# Patient Record
Sex: FEMALE | Race: ASIAN | NOT HISPANIC OR LATINO | URBAN - METROPOLITAN AREA
[De-identification: names, ages, dates, MRNs, and addresses within clinical notes are randomized per-mention and may not be internally consistent; named-entity substitution may affect disease eponyms.]

---

## 2022-12-28 ENCOUNTER — OFFICE VISIT (OUTPATIENT)
Dept: FAMILY MEDICINE | Facility: CLINIC | Age: 52
End: 2022-12-28
Payer: COMMERCIAL

## 2022-12-28 VITALS
WEIGHT: 136.6 LBS | RESPIRATION RATE: 16 BRPM | DIASTOLIC BLOOD PRESSURE: 97 MMHG | HEART RATE: 70 BPM | OXYGEN SATURATION: 98 % | TEMPERATURE: 98.5 F | SYSTOLIC BLOOD PRESSURE: 168 MMHG

## 2022-12-28 DIAGNOSIS — R30.0 DYSURIA: Primary | ICD-10-CM

## 2022-12-28 DIAGNOSIS — N89.8 VAGINAL DISCHARGE: ICD-10-CM

## 2022-12-28 LAB
ALBUMIN UR-MCNC: NEGATIVE MG/DL
APPEARANCE UR: CLEAR
BACTERIA #/AREA URNS HPF: ABNORMAL /HPF
BILIRUB UR QL STRIP: NEGATIVE
COLOR UR AUTO: YELLOW
GLUCOSE UR STRIP-MCNC: NEGATIVE MG/DL
HGB UR QL STRIP: ABNORMAL
KETONES UR STRIP-MCNC: NEGATIVE MG/DL
LEUKOCYTE ESTERASE UR QL STRIP: ABNORMAL
NITRATE UR QL: NEGATIVE
PH UR STRIP: 6 [PH] (ref 5–8)
RBC #/AREA URNS AUTO: ABNORMAL /HPF
SP GR UR STRIP: 1.01 (ref 1–1.03)
SQUAMOUS #/AREA URNS AUTO: ABNORMAL /LPF
UROBILINOGEN UR STRIP-ACNC: 0.2 E.U./DL
WBC #/AREA URNS AUTO: ABNORMAL /HPF

## 2022-12-28 PROCEDURE — 99203 OFFICE O/P NEW LOW 30 MIN: CPT | Performed by: PHYSICIAN ASSISTANT

## 2022-12-28 PROCEDURE — 81001 URINALYSIS AUTO W/SCOPE: CPT | Performed by: PHYSICIAN ASSISTANT

## 2022-12-28 PROCEDURE — 87088 URINE BACTERIA CULTURE: CPT | Performed by: PHYSICIAN ASSISTANT

## 2022-12-28 PROCEDURE — 87086 URINE CULTURE/COLONY COUNT: CPT | Performed by: PHYSICIAN ASSISTANT

## 2022-12-28 RX ORDER — CEFDINIR 300 MG/1
300 CAPSULE ORAL 2 TIMES DAILY
Qty: 20 CAPSULE | Refills: 0 | Status: SHIPPED | OUTPATIENT
Start: 2022-12-28 | End: 2023-01-07

## 2022-12-28 NOTE — PATIENT INSTRUCTIONS
Increased fluids and rest.  Discussed signs and symptoms of ascending urinary tract infection symptoms to include pyelonephritis. Instructed to turn to clinic if there are increased fever chills night sweats fatigue abdominal pain or flank pain  Antibiotic as written. Risks and benefits of medication discussed.  Indication for return to clinic.      Urinary Tract Infections in Women    Urinary tract infections (UTIs) are most often caused by bacteria (germs). These bacteria enter the urinary tract. The bacteria may come from outside the body. Or they may travel from the skin outside the rectum or vagina into the urethra. Female anatomy makes it easier for bacteria from the bowel to enter a woman s urinary tract, which is the most common source of UTI. This means women develop UTIs more often than men. Pain in or around the urinary tract is a common UTI symptom. But the only way to know for sure if you have a UTI for the health care provider to test your urine. The two tests that may be done are the urinalysis and urine culture.  Types of UTIs  Cystitis: A bladder infection (cystitis) is the most common UTI in women. You may have urgent or frequent urination. You may also have pain, burning when you urinate, and bloody urine.  Urethritis: This is an inflamed urethra, which is the tube that carries urine from the bladder to outside the body. You may have lower stomach or back pain. You may also have urgent or frequent urination.  Pyelonephritis: This is a kidney infection. If not treated, it can be serious and damage your kidneys. In severe cases, you may be hospitalized. You may have a fever and lower back pain.  Medications to treat a UTI  Most UTIs are treated with antibiotics. These kill the bacteria. The length of time you need to take them depends on the type of infection. It may be as short as 3 days. If you have repeated UTIs, a low-dose antibiotic may be needed for several months. Take antibiotics exactly as  directed. Don t stop taking them until all of the medication is gone. If you stop taking the antibiotic too soon, the infection may not go away, and you may develop a resistance to the antibiotic. This can make it much harder to treat.  Lifestyle changes to treat and prevent UTIs  The lifestyle changes below will help get rid of your UTI. They may also help prevent future UTIs.  Drink plenty of fluids. This includes water, juice, or other caffeine-free drinks. Fluids help flush bacteria out of your body.  Empty your bladder. Always empty your bladder when you feel the urge to urinate. And always urinate before going to sleep. Urine that stays in your bladder can lead to infection. Try to urinate before and after sex as well.  Practice good personal hygiene. Wipe yourself from front to back after using the toilet. This helps keep bacteria from getting into the urethra.  Use condoms during sex. These help prevent UTIs caused by sexually transmitted bacteria. Also, avoid using spermicides during sex. These can increase the risk of UTIs. Choose other forms of birth control instead. For women who tend to get UTIs after sex, a low-dose of a preventive antibiotic may be used. Be sure to discuss this option with your health care provider.  Follow up with your health care provider as directed. He or she may test to make sure the infection has cleared. If necessary, additional treatment may be started.  Date Last Reviewed: 9/8/2014 2000-2016 [employer]. [address] [place] [address], [place] [address]. All rights reserved. This information is not intended as a substitute for professional medical care. Always follow your healthcare professional's instructions.

## 2022-12-28 NOTE — PROGRESS NOTES
t   Patient presents with:  Urinary Problem: X first week December. Burning. Frequency. Urgency. Vaginal itchy x yesterday. Bright yellow to light red. Stronger odor. Aching before urinating.      Clinical Decision Making:  Multiple etiologies and diagnoses were considered to include but not limited to urinary tract infection, dysuria, hyperglycemia.  Patient is treated for a long course of treatment based on her age and other inclusion criteria.  Patient is treated with antibiotic and creatinine clearance and allergies were reviewed and expected course of resolution and indication for return was gone over.    30 min spent on the date of the encounter in chart review, patient visit, review of tests, documentation and/ordiscussion with other providers about the issues documented above. Extra time was taken to go over history physical and treatment plan with .       ICD-10-CM    1. Dysuria  R30.0 UA macro with reflex to Microscopic and Culture - Clinc Collect     cefdinir (OMNICEF) 300 MG capsule     Urine Microscopic Exam     Urine Culture      2. Vaginal discharge  N89.8 CANCELED: Wet prep - Clinic Collect          Patient Instructions   Increased fluids and rest.  Discussed signs and symptoms of ascending urinary tract infection symptoms to include pyelonephritis. Instructed to turn to clinic if there are increased fever chills night sweats fatigue abdominal pain or flank pain  Antibiotic as written. Risks and benefits of medication discussed.  Indication for return to clinic.      Urinary Tract Infections in Women    Urinary tract infections (UTIs) are most often caused by bacteria (germs). These bacteria enter the urinary tract. The bacteria may come from outside the body. Or they may travel from the skin outside the rectum or vagina into the urethra. Female anatomy makes it easier for bacteria from the bowel to enter a woman s urinary tract, which is the most common source of UTI. This means women  develop UTIs more often than men. Pain in or around the urinary tract is a common UTI symptom. But the only way to know for sure if you have a UTI for the health care provider to test your urine. The two tests that may be done are the urinalysis and urine culture.  Types of UTIs    Cystitis: A bladder infection (cystitis) is the most common UTI in women. You may have urgent or frequent urination. You may also have pain, burning when you urinate, and bloody urine.    Urethritis: This is an inflamed urethra, which is the tube that carries urine from the bladder to outside the body. You may have lower stomach or back pain. You may also have urgent or frequent urination.    Pyelonephritis: This is a kidney infection. If not treated, it can be serious and damage your kidneys. In severe cases, you may be hospitalized. You may have a fever and lower back pain.  Medications to treat a UTI  Most UTIs are treated with antibiotics. These kill the bacteria. The length of time you need to take them depends on the type of infection. It may be as short as 3 days. If you have repeated UTIs, a low-dose antibiotic may be needed for several months. Take antibiotics exactly as directed. Don t stop taking them until all of the medication is gone. If you stop taking the antibiotic too soon, the infection may not go away, and you may develop a resistance to the antibiotic. This can make it much harder to treat.  Lifestyle changes to treat and prevent UTIs  The lifestyle changes below will help get rid of your UTI. They may also help prevent future UTIs.    Drink plenty of fluids. This includes water, juice, or other caffeine-free drinks. Fluids help flush bacteria out of your body.    Empty your bladder. Always empty your bladder when you feel the urge to urinate. And always urinate before going to sleep. Urine that stays in your bladder can lead to infection. Try to urinate before and after sex as well.    Practice good personal hygiene.  Wipe yourself from front to back after using the toilet. This helps keep bacteria from getting into the urethra.    Use condoms during sex. These help prevent UTIs caused by sexually transmitted bacteria. Also, avoid using spermicides during sex. These can increase the risk of UTIs. Choose other forms of birth control instead. For women who tend to get UTIs after sex, a low-dose of a preventive antibiotic may be used. Be sure to discuss this option with your health care provider.    Follow up with your health care provider as directed. He or she may test to make sure the infection has cleared. If necessary, additional treatment may be started.  Date Last Reviewed: 9/8/2014 2000-2016 [employer]. [address] [place] [address], [place] [address]. All rights reserved. This information is not intended as a substitute for professional medical care. Always follow your healthcare professional's instructions.                        HPI:  Amina Schumacher is a 52 year old female who presents today for a one week history of urinary hesitancy urgency frequency and dysuria and slight gross hematuria.  Patient had vaginal itching yesterday but did not have vaginal discharge.  No abdominal or back pain fever chills or night sweats to report.  Declines concern for STD screening    History obtained from chart review and the patient.    Problem List:  There are no relevant problems documented for this patient.      History reviewed. No pertinent past medical history.    Social History     Tobacco Use     Smoking status: Never     Smokeless tobacco: Never   Substance Use Topics     Alcohol use: Not on file       Review of Systems  As above in HPI otherwise negative.    Vitals:    12/28/22 1143   BP: (!) 168/97   BP Location: Right arm   Patient Position: Sitting   Cuff Size: Adult Regular   Pulse: 70   Resp: 16   Temp: 98.5  F (36.9  C)   TempSrc: Oral   SpO2: 98%   Weight: 62 kg (136 lb 9.6 oz)       General: Patient is resting  comfortably no acute distress is afebrile  HEENT: Head is normocephalic atraumatic   eyes are PERRL EOMI sclera anicteric   TMs are clear bilaterally  Throat is with mild pharyngeal wall erythema and no exudate  No cervical lymphadenopathy present  LUNGS: Clear to auscultation bilaterally  HEART: Regular rate and rhythm  Abdomen: Suprapubic tenderness to palpation but no induration no CVA tenderness to percussion  Skin: Without rash non-diaphoretic    Physical Exam      Labs:  Results for orders placed or performed in visit on 12/28/22   UA macro with reflex to Microscopic and Culture - [facility] Collect     Status: Abnormal    Specimen: Urine, Clean Catch   Result Value Ref Range    Color Urine Yellow Colorless, Straw, Light Yellow, Yellow    Appearance Urine Clear Clear    Glucose Urine Negative Negative mg/dL    Bilirubin Urine Negative Negative    Ketones Urine Negative Negative mg/dL    Specific Gravity Urine 1.010 1.005 - 1.030    Blood Urine Trace (A) Negative    pH Urine 6.0 5.0 - 8.0    Protein Albumin Urine Negative Negative mg/dL    Urobilinogen Urine 0.2 0.2, 1.0 E.U./dL    Nitrite Urine Negative Negative    Leukocyte Esterase Urine Moderate (A) Negative   Urine Microscopic Exam     Status: Abnormal   Result Value Ref Range    Bacteria Urine Few (A) None Seen /HPF    RBC Urine 0-2 0-2 /HPF /HPF    WBC Urine 5-10 (A) 0-5 /HPF /HPF    Squamous Epithelials Urine Few (A) None Seen /LPF                           Urine culture is pending.    At the end of the encounter, I discussed results, diagnosis, medications. Discussed red flags for immediate return to clinic/ER, as well as indications for follow up if no improvement. Patient understood and agreed to plan. Patient was stable for discharge.

## 2022-12-30 LAB
BACTERIA UR CULT: ABNORMAL
BACTERIA UR CULT: ABNORMAL

## 2023-03-10 ENCOUNTER — OFFICE VISIT (OUTPATIENT)
Dept: FAMILY MEDICINE | Facility: CLINIC | Age: 53
End: 2023-03-10
Payer: COMMERCIAL

## 2023-03-10 VITALS
TEMPERATURE: 98.4 F | HEART RATE: 74 BPM | OXYGEN SATURATION: 97 % | SYSTOLIC BLOOD PRESSURE: 154 MMHG | DIASTOLIC BLOOD PRESSURE: 91 MMHG | RESPIRATION RATE: 16 BRPM | WEIGHT: 136 LBS

## 2023-03-10 DIAGNOSIS — Z11.4 SCREENING FOR HIV (HUMAN IMMUNODEFICIENCY VIRUS): ICD-10-CM

## 2023-03-10 DIAGNOSIS — R30.0 DYSURIA: ICD-10-CM

## 2023-03-10 DIAGNOSIS — I10 ESSENTIAL HYPERTENSION: Primary | ICD-10-CM

## 2023-03-10 DIAGNOSIS — Z11.59 NEED FOR HEPATITIS C SCREENING TEST: ICD-10-CM

## 2023-03-10 DIAGNOSIS — Z13.220 SCREENING FOR HYPERLIPIDEMIA: ICD-10-CM

## 2023-03-10 LAB
ALBUMIN UR-MCNC: NEGATIVE MG/DL
ANION GAP SERPL CALCULATED.3IONS-SCNC: 10 MMOL/L (ref 3–14)
APPEARANCE UR: CLEAR
BILIRUB UR QL STRIP: NEGATIVE
BUN SERPL-MCNC: 9 MG/DL (ref 7–30)
CALCIUM SERPL-MCNC: 9.8 MG/DL (ref 8.5–10.1)
CHLORIDE BLD-SCNC: 99 MMOL/L (ref 94–109)
CHOLEST SERPL-MCNC: 347 MG/DL
CO2 SERPL-SCNC: 34 MMOL/L (ref 20–32)
COLOR UR AUTO: YELLOW
CREAT SERPL-MCNC: 0.7 MG/DL (ref 0.52–1.04)
CREAT UR-MCNC: 16 MG/DL
GFR SERPL CREATININE-BSD FRML MDRD: >90 ML/MIN/1.73M2
GLUCOSE BLD-MCNC: 94 MG/DL (ref 70–99)
GLUCOSE UR STRIP-MCNC: NEGATIVE MG/DL
HDLC SERPL-MCNC: 64 MG/DL
HGB UR QL STRIP: NEGATIVE
KETONES UR STRIP-MCNC: NEGATIVE MG/DL
LDLC SERPL CALC-MCNC: 262 MG/DL
LEUKOCYTE ESTERASE UR QL STRIP: NEGATIVE
MICROALBUMIN UR-MCNC: <12 MG/L
MICROALBUMIN/CREAT UR: NORMAL MG/G{CREAT}
NITRATE UR QL: NEGATIVE
NONHDLC SERPL-MCNC: 283 MG/DL
PH UR STRIP: 7 [PH] (ref 5–8)
POTASSIUM BLD-SCNC: 4.5 MMOL/L (ref 3.4–5.3)
SODIUM SERPL-SCNC: 143 MMOL/L (ref 133–144)
SP GR UR STRIP: 1.01 (ref 1–1.03)
TRIGL SERPL-MCNC: 105 MG/DL
TSH SERPL DL<=0.005 MIU/L-ACNC: 1.9 UIU/ML (ref 0.3–4.2)
UROBILINOGEN UR STRIP-ACNC: 0.2 E.U./DL

## 2023-03-10 PROCEDURE — 80061 LIPID PANEL: CPT | Performed by: STUDENT IN AN ORGANIZED HEALTH CARE EDUCATION/TRAINING PROGRAM

## 2023-03-10 PROCEDURE — 82043 UR ALBUMIN QUANTITATIVE: CPT | Performed by: STUDENT IN AN ORGANIZED HEALTH CARE EDUCATION/TRAINING PROGRAM

## 2023-03-10 PROCEDURE — 82570 ASSAY OF URINE CREATININE: CPT | Performed by: STUDENT IN AN ORGANIZED HEALTH CARE EDUCATION/TRAINING PROGRAM

## 2023-03-10 PROCEDURE — 87389 HIV-1 AG W/HIV-1&-2 AB AG IA: CPT | Performed by: STUDENT IN AN ORGANIZED HEALTH CARE EDUCATION/TRAINING PROGRAM

## 2023-03-10 PROCEDURE — 36415 COLL VENOUS BLD VENIPUNCTURE: CPT | Performed by: STUDENT IN AN ORGANIZED HEALTH CARE EDUCATION/TRAINING PROGRAM

## 2023-03-10 PROCEDURE — 80048 BASIC METABOLIC PNL TOTAL CA: CPT | Performed by: STUDENT IN AN ORGANIZED HEALTH CARE EDUCATION/TRAINING PROGRAM

## 2023-03-10 PROCEDURE — 86803 HEPATITIS C AB TEST: CPT | Performed by: STUDENT IN AN ORGANIZED HEALTH CARE EDUCATION/TRAINING PROGRAM

## 2023-03-10 PROCEDURE — 84443 ASSAY THYROID STIM HORMONE: CPT | Performed by: STUDENT IN AN ORGANIZED HEALTH CARE EDUCATION/TRAINING PROGRAM

## 2023-03-10 PROCEDURE — 81003 URINALYSIS AUTO W/O SCOPE: CPT | Performed by: STUDENT IN AN ORGANIZED HEALTH CARE EDUCATION/TRAINING PROGRAM

## 2023-03-10 PROCEDURE — 99214 OFFICE O/P EST MOD 30 MIN: CPT | Mod: GC | Performed by: STUDENT IN AN ORGANIZED HEALTH CARE EDUCATION/TRAINING PROGRAM

## 2023-03-10 RX ORDER — LOSARTAN POTASSIUM 50 MG/1
50 TABLET ORAL DAILY
Qty: 30 TABLET | Refills: 1 | Status: SHIPPED | OUTPATIENT
Start: 2023-03-10 | End: 2023-03-24

## 2023-03-10 NOTE — NURSING NOTE
Due to patient being non-English speaking/uses sign language, an  was used for this visit. Only for face-to-face interpretation by an external agency, date and length of interpretation can be found on the scanned worksheet.     name: Lawrence  Agency: [employer]  Language: Cindi   Telephone number: [phone]  Type of interpretation: Telephone, spoken

## 2023-03-10 NOTE — LETTER
March 15, 2023      Amina Schumacher  [address] [place] [address]        Dear ,    We are writing to inform you of your test results.    Dear Ma,     These are the results of your recent blood test.  They show high cholesterol.  We will discuss medication at our next visit on March 24.  Your kidney and thyroid function are normal.     This is just for your records.  We will go over these results next week.     Sincerely,   Dr. Yost       Resulted Orders   HIV Screening   Result Value Ref Range    HIV Antigen Antibody Combo Nonreactive Nonreactive      Comment:      HIV-1 p24 Ag & HIV-1/HIV-2 Ab Not Detected   Hepatitis C Screen Reflex to HCV RNA Quant and Genotype   Result Value Ref Range    Hepatitis C Antibody Nonreactive Nonreactive    Narrative    Assay performance characteristics have not been established for newborns, infants, and children.   Lipid panel reflex to direct LDL Non-fasting   Result Value Ref Range    Cholesterol 347 (H) <200 mg/dL    Triglycerides 105 <150 mg/dL    Direct Measure HDL 64 >=50 mg/dL    LDL Cholesterol Calculated 262 (H) <=100 mg/dL    Non HDL Cholesterol 283 (H) <130 mg/dL    Narrative    Cholesterol  Desirable:  <200 mg/dL    Triglycerides  Normal:  Less than 150 mg/dL  Borderline High:  150-199 mg/dL  High:  200-499 mg/dL  Very High:  Greater than or equal to 500 mg/dL    Direct Measure HDL  Female:  Greater than or equal to 50 mg/dL   Male:  Greater than or equal to 40 mg/dL    LDL Cholesterol  Desirable:  <100mg/dL  Above Desirable:  100-129 mg/dL   Borderline High:  130-159 mg/dL   High:  160-189 mg/dL   Very High:  >= 190 mg/dL    Non HDL Cholesterol  Desirable:  130 mg/dL  Above Desirable:  130-159 mg/dL  Borderline High:  160-189 mg/dL  High:  190-219 mg/dL  Very High:  Greater than or equal to 220 mg/dL   Basic metabolic panel   Result Value Ref Range    Sodium 143 133 - 144 mmol/L    Potassium 4.5 3.4 - 5.3 mmol/L    Chloride 99 94 - 109 mmol/L    Carbon  Dioxide (CO2) 34 (H) 20 - 32 mmol/L    Anion Gap 10 3 - 14 mmol/L    Urea Nitrogen 9 7 - 30 mg/dL    Creatinine 0.70 0.52 - 1.04 mg/dL    Calcium 9.8 8.5 - 10.1 mg/dL    Glucose 94 70 - 99 mg/dL    GFR Estimate >90 >60 mL/min/1.73m2      Comment:      eGFR calculated using 2021 CKD-EPI equation.   Albumin Random Urine Quantitative with Creat Ratio   Result Value Ref Range    Creatinine Urine mg/dL 16.0 mg/dL      Comment:      The reference ranges have not been established in urine creatinine. The results should be integrated into the clinical context for interpretation.    Albumin Urine mg/L <12.0 mg/L      Comment:      The reference ranges have not been established in urine albumin. The results should be integrated into the clinical context for interpretation.    Albumin Urine mg/g Cr        Comment:      Unable to calculate, urine albumin and/or urine creatinine is outside detectable limits.  Microalbuminuria is defined as an albumin:creatinine ratio of 17 to 299 for males and 25 to 299 for females. A ratio of albumin:creatinine of 300 or higher is indicative of overt proteinuria.  Due to biologic variability, positive results should be confirmed by a second, first-morning random or 24-hour timed urine specimen. If there is discrepancy, a third specimen is recommended. When 2 out of 3 results are in the microalbuminuria range, this is evidence for incipient nephropathy and warrants increased efforts at glucose control, blood pressure control, and institution of therapy with an angiotensin-converting-enzyme (ACE) inhibitor (if the patient can tolerate it).     UA reflex to Microscopic   Result Value Ref Range    Color Urine Yellow Colorless, Straw, Light Yellow, Yellow    Appearance Urine Clear Clear    Glucose Urine Negative Negative mg/dL    Bilirubin Urine Negative Negative    Ketones Urine Negative Negative mg/dL    Specific Gravity Urine 1.010 1.005 - 1.030    Blood Urine Negative Negative    pH Urine 7.0  5.0 - 8.0    Protein Albumin Urine Negative Negative mg/dL    Urobilinogen Urine 0.2 0.2, 1.0 E.U./dL    Nitrite Urine Negative Negative    Leukocyte Esterase Urine Negative Negative    Narrative    Microscopic not indicated   TSH with free T4 reflex   Result Value Ref Range    TSH 1.90 0.30 - 4.20 uIU/mL       If you have any questions or concerns, please call the clinic at the number listed above.       Sincerely,      Russel Magaña MD

## 2023-03-10 NOTE — PROGRESS NOTES
Assessment & Plan   Encounter Date: Mar 10, 2023    Essential hypertension  Patient reports blood pressure levels around 160/120 at home, taken in the evening. She has no history of hypertension and has never taken medications for this. Has headaches associated with her hypertensive episodes. Thyroid exam was normal. Reviewed BMP.  Cr wnl.  K 4.5.  - Basic metabolic panel; Future  - Albumin Random Urine Quantitative with Creat Ratio; Future  - losartan (COZAAR) 50 MG tablet; Take 1 tablet (50 mg) by mouth daily for 30 days  - TSH with free T4 reflex; Future  - Lifestyle changes; low salt diet, increase exercise each day (walk 20 minutes)    Dysuria  Previous UTI treated on 12/28/22 for gardnerella vaginalis. She continues to have some dysuria, but says the symptoms are less severe and she denies pelvic pain. She also denies vaginal itchiness or discharge. She agrees with urinalysis to monitor treatment of infection.   - UA reflex to Microscopic; Future    Screening for HIV (human immunodeficiency virus)  Need for hepatitis C screening test  Screening for hyperlipidemia  Patient due for general screening. She is not concerned about STI.  - HIV Screening; Future  - Hepatitis C Screen Reflex to HCV RNA Quant and Genotype; Future  - Lipid panel reflex to direct LDL Non-fasting; Future      Follow-Up in two weeks    Sampson Fung, MS3  [employer]    Resident/Fellow Attestation   I, Mirna Yost MD, was present with the medical/GIANNA student who participated in the service and in the documentation of the note.  I have verified the history and personally performed the physical exam and medical decision making.  I agree with the assessment and plan of care as documented in the note.      Mirna Yost MD  PGY3      Mirna Yost MD PGY-3   [facility]   Staffed with Dr. Magaña.        ______________________________________________________    Subjective       CC:  Other (Pt originally wanted to do a physical /High BP check /Dm check and cholesterol check )    HPI:  Ms. Amina Schumacher is a(n) 53 year old female who presents today as a new patient for:  Hypertension   Headaches  Dysuria    Patient presents to the clinic with concern for elevated blood pressure and desire for diabetes and hyperlipidemia screening. She has been taking her blood pressures at home and frequently gets pressures around 160/100. She also reports associated constant right sided headaches when she has elevated blood pressure. The patient denies any nausea or vomiting but does report some neck pain with the headaches. She denies any chest discomfort or difficulty breathing with these episodes. The patient has no history of diagnosed hypertension and has never taken any medications for this. She denies family history of hypertension, heart disease, or diabetes. Patient is concerned about diabetes and reports that she would like to be screened today.     The patient also has some urinary symptoms. She was treated for a urinary tract infection on 12/28/22 and was experiencing burning and urgency with urination with a strong odor and a yellow or light red color. She said symptoms have improved but it is still uncomfortable when she urinates. She has no pelvic pain, no vaginal itchiness or odor, no changes in bowel movements or abdominal pain. The patient has no other concerns at this time.       Patient is otherwise feeling well, without additional concerns.    A remote phone    Labs Reviewed:    Office Visit on 12/28/2022   Component Date Value Ref Range Status     Color Urine 12/28/2022 Yellow  Colorless, Straw, Light Yellow, Yellow Final     Appearance Urine 12/28/2022 Clear  Clear Final     Glucose Urine 12/28/2022 Negative  Negative mg/dL Final     Bilirubin Urine 12/28/2022 Negative  Negative Final     Ketones Urine 12/28/2022 Negative  Negative mg/dL Final     Specific Gravity Urine 12/28/2022 1.010  1.005 -  1.030 Final     Blood Urine 12/28/2022 Trace (A)  Negative Final     pH Urine 12/28/2022 6.0  5.0 - 8.0 Final     Protein Albumin Urine 12/28/2022 Negative  Negative mg/dL Final     Urobilinogen Urine 12/28/2022 0.2  0.2, 1.0 E.U./dL Final     Nitrite Urine 12/28/2022 Negative  Negative Final     Leukocyte Esterase Urine 12/28/2022 Moderate (A)  Negative Final     Bacteria Urine 12/28/2022 Few (A)  None Seen /HPF Final     RBC Urine 12/28/2022 0-2  0-2 /HPF /HPF Final     WBC Urine 12/28/2022 5-10 (A)  0-5 /HPF /HPF Final     Squamous Epithelials Urine 12/28/2022 Few (A)  None Seen /LPF Final     Culture 12/28/2022 50,000-100,000 CFU/mL Gardnerella vaginalis (A)   Final    Susceptibilities not routinely done     Culture 12/28/2022 <10,000 CFU/mL Urogenital lesly   Final     No results found.    Medications:  Current Outpatient Medications   Medication     losartan (COZAAR) 50 MG tablet     No current facility-administered medications for this visit.      Past Medical History:   There is no problem list on file for this patient.    No past medical history on file.           Objective     BP (!) 154/91 (BP Location: Left arm, Patient Position: Sitting, Cuff Size: Adult Regular)   Pulse 74   Temp 98.4  F (36.9  C) (Oral)   Resp 16   Wt 61.7 kg (136 lb)   SpO2 97%     Pertinent Physical Exam Findings    Physical Exam  Constitutional:       General: She is not in acute distress.     Appearance: She is not toxic-appearing.   Cardiovascular:      Rate and Rhythm: Normal rate and regular rhythm.      Heart sounds: Normal heart sounds. No murmur heard.    No friction rub.   Pulmonary:      Breath sounds: Normal breath sounds. No stridor. No wheezing.   Neurological:      Mental Status: She is oriented to person, place, and time.   Psychiatric:         Mood and Affect: Mood normal.           Results for orders placed or performed in visit on 03/10/23   HIV Screening     Status: Normal   Result Value Ref Range    HIV  Antigen Antibody Combo Nonreactive Nonreactive   Hepatitis C Screen Reflex to HCV RNA Quant and Genotype     Status: Normal   Result Value Ref Range    Hepatitis C Antibody Nonreactive Nonreactive    Narrative    Assay performance characteristics have not been established for newborns, infants, and children.   Lipid panel reflex to direct LDL Non-fasting     Status: Abnormal   Result Value Ref Range    Cholesterol 347 (H) <200 mg/dL    Triglycerides 105 <150 mg/dL    Direct Measure HDL 64 >=50 mg/dL    LDL Cholesterol Calculated 262 (H) <=100 mg/dL    Non HDL Cholesterol 283 (H) <130 mg/dL    Narrative    Cholesterol  Desirable:  <200 mg/dL    Triglycerides  Normal:  Less than 150 mg/dL  Borderline High:  150-199 mg/dL  High:  200-499 mg/dL  Very High:  Greater than or equal to 500 mg/dL    Direct Measure HDL  Female:  Greater than or equal to 50 mg/dL   Male:  Greater than or equal to 40 mg/dL    LDL Cholesterol  Desirable:  <100mg/dL  Above Desirable:  100-129 mg/dL   Borderline High:  130-159 mg/dL   High:  160-189 mg/dL   Very High:  >= 190 mg/dL    Non HDL Cholesterol  Desirable:  130 mg/dL  Above Desirable:  130-159 mg/dL  Borderline High:  160-189 mg/dL  High:  190-219 mg/dL  Very High:  Greater than or equal to 220 mg/dL   Basic metabolic panel     Status: Abnormal   Result Value Ref Range    Sodium 143 133 - 144 mmol/L    Potassium 4.5 3.4 - 5.3 mmol/L    Chloride 99 94 - 109 mmol/L    Carbon Dioxide (CO2) 34 (H) 20 - 32 mmol/L    Anion Gap 10 3 - 14 mmol/L    Urea Nitrogen 9 7 - 30 mg/dL    Creatinine 0.70 0.52 - 1.04 mg/dL    Calcium 9.8 8.5 - 10.1 mg/dL    Glucose 94 70 - 99 mg/dL    GFR Estimate >90 >60 mL/min/1.73m2   Albumin Random Urine Quantitative with Creat Ratio     Status: None   Result Value Ref Range    Creatinine Urine mg/dL 16.0 mg/dL    Albumin Urine mg/L <12.0 mg/L    Albumin Urine mg/g Cr     UA reflex to Microscopic     Status: Normal   Result Value Ref Range    Color Urine Yellow  Colorless, Straw, Light Yellow, Yellow    Appearance Urine Clear Clear    Glucose Urine Negative Negative mg/dL    Bilirubin Urine Negative Negative    Ketones Urine Negative Negative mg/dL    Specific Gravity Urine 1.010 1.005 - 1.030    Blood Urine Negative Negative    pH Urine 7.0 5.0 - 8.0    Protein Albumin Urine Negative Negative mg/dL    Urobilinogen Urine 0.2 0.2, 1.0 E.U./dL    Nitrite Urine Negative Negative    Leukocyte Esterase Urine Negative Negative    Narrative    Microscopic not indicated   TSH with free T4 reflex     Status: Normal   Result Value Ref Range    TSH 1.90 0.30 - 4.20 uIU/mL                ----- Service Performed and Documented by Resident or Fellow ------

## 2023-03-10 NOTE — PATIENT INSTRUCTIONS
Start taking losartan 50 mg at bedtime     Check your blood pressure everyday, ideally in the morning, and write down on the calendar    See you back in 2 weeks!

## 2023-03-10 NOTE — PROGRESS NOTES
Preceptor Attestation:    I discussed the patient with the resident and evaluated the patient in person. I have verified the content of the note, which accurately reflects my assessment of the patient and the plan of care.   Supervising Physician:  Russel Magaña MD.

## 2023-03-12 LAB
HCV AB SERPL QL IA: NONREACTIVE
HIV 1+2 AB+HIV1 P24 AG SERPL QL IA: NONREACTIVE

## 2023-03-24 ENCOUNTER — OFFICE VISIT (OUTPATIENT)
Dept: FAMILY MEDICINE | Facility: CLINIC | Age: 53
End: 2023-03-24
Payer: COMMERCIAL

## 2023-03-24 VITALS
HEART RATE: 75 BPM | WEIGHT: 137.4 LBS | RESPIRATION RATE: 16 BRPM | OXYGEN SATURATION: 99 % | TEMPERATURE: 98 F | SYSTOLIC BLOOD PRESSURE: 157 MMHG | DIASTOLIC BLOOD PRESSURE: 85 MMHG

## 2023-03-24 DIAGNOSIS — G47.9 SLEEPING DIFFICULTY: ICD-10-CM

## 2023-03-24 DIAGNOSIS — Z23 NEED FOR VACCINATION: ICD-10-CM

## 2023-03-24 DIAGNOSIS — I10 ESSENTIAL HYPERTENSION: Primary | ICD-10-CM

## 2023-03-24 DIAGNOSIS — E78.5 HYPERLIPIDEMIA LDL GOAL <100: ICD-10-CM

## 2023-03-24 DIAGNOSIS — R30.0 DYSURIA: ICD-10-CM

## 2023-03-24 LAB
ALBUMIN UR-MCNC: NEGATIVE MG/DL
ANION GAP SERPL CALCULATED.3IONS-SCNC: 15 MMOL/L (ref 7–15)
APPEARANCE UR: CLEAR
BACTERIA #/AREA URNS HPF: ABNORMAL /HPF
BILIRUB UR QL STRIP: NEGATIVE
BUN SERPL-MCNC: 13.8 MG/DL (ref 6–20)
CALCIUM SERPL-MCNC: 9.6 MG/DL (ref 8.6–10)
CHLORIDE SERPL-SCNC: 102 MMOL/L (ref 98–107)
COLOR UR AUTO: YELLOW
CREAT SERPL-MCNC: 0.74 MG/DL (ref 0.51–0.95)
DEPRECATED HCO3 PLAS-SCNC: 23 MMOL/L (ref 22–29)
GFR SERPL CREATININE-BSD FRML MDRD: >90 ML/MIN/1.73M2
GLUCOSE SERPL-MCNC: 102 MG/DL (ref 70–99)
GLUCOSE UR STRIP-MCNC: NEGATIVE MG/DL
HGB UR QL STRIP: ABNORMAL
KETONES UR STRIP-MCNC: NEGATIVE MG/DL
LEUKOCYTE ESTERASE UR QL STRIP: ABNORMAL
NITRATE UR QL: NEGATIVE
PH UR STRIP: 6.5 [PH] (ref 5–8)
POTASSIUM SERPL-SCNC: 4 MMOL/L (ref 3.4–5.3)
RBC #/AREA URNS AUTO: ABNORMAL /HPF
SODIUM SERPL-SCNC: 140 MMOL/L (ref 136–145)
SP GR UR STRIP: 1.01 (ref 1–1.03)
SQUAMOUS #/AREA URNS AUTO: ABNORMAL /LPF
UROBILINOGEN UR STRIP-ACNC: 0.2 E.U./DL
WBC #/AREA URNS AUTO: ABNORMAL /HPF

## 2023-03-24 PROCEDURE — 90472 IMMUNIZATION ADMIN EACH ADD: CPT | Performed by: STUDENT IN AN ORGANIZED HEALTH CARE EDUCATION/TRAINING PROGRAM

## 2023-03-24 PROCEDURE — 90677 PCV20 VACCINE IM: CPT | Performed by: STUDENT IN AN ORGANIZED HEALTH CARE EDUCATION/TRAINING PROGRAM

## 2023-03-24 PROCEDURE — 90471 IMMUNIZATION ADMIN: CPT | Performed by: STUDENT IN AN ORGANIZED HEALTH CARE EDUCATION/TRAINING PROGRAM

## 2023-03-24 PROCEDURE — 81001 URINALYSIS AUTO W/SCOPE: CPT | Performed by: STUDENT IN AN ORGANIZED HEALTH CARE EDUCATION/TRAINING PROGRAM

## 2023-03-24 PROCEDURE — 99214 OFFICE O/P EST MOD 30 MIN: CPT | Mod: 25 | Performed by: STUDENT IN AN ORGANIZED HEALTH CARE EDUCATION/TRAINING PROGRAM

## 2023-03-24 PROCEDURE — 80048 BASIC METABOLIC PNL TOTAL CA: CPT | Performed by: STUDENT IN AN ORGANIZED HEALTH CARE EDUCATION/TRAINING PROGRAM

## 2023-03-24 PROCEDURE — 36415 COLL VENOUS BLD VENIPUNCTURE: CPT | Performed by: STUDENT IN AN ORGANIZED HEALTH CARE EDUCATION/TRAINING PROGRAM

## 2023-03-24 PROCEDURE — 90715 TDAP VACCINE 7 YRS/> IM: CPT | Performed by: STUDENT IN AN ORGANIZED HEALTH CARE EDUCATION/TRAINING PROGRAM

## 2023-03-24 RX ORDER — HYDROXYZINE HYDROCHLORIDE 25 MG/1
25 TABLET, FILM COATED ORAL
Qty: 90 TABLET | Refills: 1 | Status: SHIPPED | OUTPATIENT
Start: 2023-03-24

## 2023-03-24 RX ORDER — LOSARTAN POTASSIUM 100 MG/1
100 TABLET ORAL DAILY
Qty: 90 TABLET | Refills: 3 | Status: SHIPPED | OUTPATIENT
Start: 2023-03-24 | End: 2024-04-18

## 2023-03-24 RX ORDER — ATORVASTATIN CALCIUM 40 MG/1
40 TABLET, FILM COATED ORAL DAILY
Qty: 90 TABLET | Refills: 3 | Status: SHIPPED | OUTPATIENT
Start: 2023-03-24 | End: 2024-04-18

## 2023-03-24 NOTE — PROGRESS NOTES
Assessment & Plan   Encounter Date: Mar 24, 2023    Essential hypertension  Increased losartan today to 100 mg daily.  BMP did not show potassium abnormalities.  Follow-up in 2 to 4 weeks to assess for improvement in blood pressure.  She will attempt to take blood pressures when she wakes up.  She works late at night.  - Home Blood Pressure Monitor Order for DME - ONLY FOR DME  - losartan (COZAAR) 100 MG tablet; Take 1 tablet (100 mg) by mouth daily  - Basic metabolic panel; Future  - Basic metabolic panel    Hyperlipidemia LDL goal <100  Lipid panel 2 weeks ago showed an increase in LDL.  Her BMI is normal.  Will initiate atorvastatin 40 mg.  Repeat lipid in 3 months to assess response.  - atorvastatin (LIPITOR) 40 MG tablet; Take 1 tablet (40 mg) by mouth daily    Need for vaccination  - PNEUMOCOCCAL 20 VALENT CONJUGATE (PREVNAR 20)    Sleeping difficulty  - hydrOXYzine (ATARAX) 25 MG tablet; Take 1 tablet (25 mg) by mouth nightly as needed for itching May repeat x1    Dysuria  UA showed a small amount of leukocyte esterase and trace blood with no RBCs.  Will send Macrobid for 5 days to treat for presumed UTI.  - UA reflex to Microscopic; Future  - UA reflex to Microscopic  - Urine Microscopic Exam          Follow-Up:   Follow-up urinary symptoms  Blood pressure log  Could add thiazide or CCB for better control if no significant change with full dose losartan    Mirna Yost MD PGY-3   [facility]   Staffed with Dr. Loes.        ______________________________________________________    Subjective     HPI:  Ms. Amina Schumacher is a(n) 53 year old female who presents today as a return patient for:  Follow Up (Follow up BP /Ha been feeling dizzy, Headaches and muscle cramps since last Tuesday because of her BP. She was unable to work because she felt as if she was drunk from being to dizzy. /Pt wants sleeping pills because she has trouble sleeping from time to time )    She has been taking her blood  pressure at home via her roommate's BP monitor.  Takes at night when she gets home from work. Started losartan.    121/75, 133/80, 154/84        Preventive Care:  Needs preventive care visit, and this was discussed at todays visit    Patient is otherwise feeling well, without additional concerns.    A remote phone [employer]  was used for this visit.     Data Reviewed:    Office Visit on 03/10/2023   Component Date Value Ref Range Status     HIV Antigen Antibody Combo 03/10/2023 Nonreactive  Nonreactive Final    HIV-1 p24 Ag & HIV-1/HIV-2 Ab Not Detected     Hepatitis C Antibody 03/10/2023 Nonreactive  Nonreactive Final     Cholesterol 03/10/2023 347 (H)  <200 mg/dL Final     Triglycerides 03/10/2023 105  <150 mg/dL Final     Direct Measure HDL 03/10/2023 64  >=50 mg/dL Final     LDL Cholesterol Calculated 03/10/2023 262 (H)  <=100 mg/dL Final     Non HDL Cholesterol 03/10/2023 283 (H)  <130 mg/dL Final     Sodium 03/10/2023 143  133 - 144 mmol/L Final     Potassium 03/10/2023 4.5  3.4 - 5.3 mmol/L Final     Chloride 03/10/2023 99  94 - 109 mmol/L Final     Carbon Dioxide (CO2) 03/10/2023 34 (H)  20 - 32 mmol/L Final     Anion Gap 03/10/2023 10  3 - 14 mmol/L Final     Urea Nitrogen 03/10/2023 9  7 - 30 mg/dL Final     Creatinine 03/10/2023 0.70  0.52 - 1.04 mg/dL Final     Calcium 03/10/2023 9.8  8.5 - 10.1 mg/dL Final     Glucose 03/10/2023 94  70 - 99 mg/dL Final     GFR Estimate 03/10/2023 >90  >60 mL/min/1.73m2 Final    eGFR calculated using 2021 CKD-EPI equation.     Creatinine Urine mg/dL 03/10/2023 16.0  mg/dL Final    The reference ranges have not been established in urine creatinine. The results should be integrated into the clinical context for interpretation.     Albumin Urine mg/L 03/10/2023 <12.0  mg/L Final    The reference ranges have not been established in urine albumin. The results should be integrated into the clinical context for interpretation.     Albumin Urine mg/g Cr 03/10/2023     Final    Unable to calculate, urine albumin and/or urine creatinine is outside detectable limits.  Microalbuminuria is defined as an albumin:creatinine ratio of 17 to 299 for males and 25 to 299 for females. A ratio of albumin:creatinine of 300 or higher is indicative of overt proteinuria.  Due to biologic variability, positive results should be confirmed by a second, first-morning random or 24-hour timed urine specimen. If there is discrepancy, a third specimen is recommended. When 2 out of 3 results are in the microalbuminuria range, this is evidence for incipient nephropathy and warrants increased efforts at glucose control, blood pressure control, and institution of therapy with an angiotensin-converting-enzyme (ACE) inhibitor (if the patient can tolerate it).       Color Urine 03/10/2023 Yellow  Colorless, Straw, Light Yellow, Yellow Final     Appearance Urine 03/10/2023 Clear  Clear Final     Glucose Urine 03/10/2023 Negative  Negative mg/dL Final     Bilirubin Urine 03/10/2023 Negative  Negative Final     Ketones Urine 03/10/2023 Negative  Negative mg/dL Final     Specific Gravity Urine 03/10/2023 1.010  1.005 - 1.030 Final     Blood Urine 03/10/2023 Negative  Negative Final     pH Urine 03/10/2023 7.0  5.0 - 8.0 Final     Protein Albumin Urine 03/10/2023 Negative  Negative mg/dL Final     Urobilinogen Urine 03/10/2023 0.2  0.2, 1.0 E.U./dL Final     Nitrite Urine 03/10/2023 Negative  Negative Final     Leukocyte Esterase Urine 03/10/2023 Negative  Negative Final     TSH 03/10/2023 1.90  0.30 - 4.20 uIU/mL Final         Medications:  Current Outpatient Medications   Medication     losartan (COZAAR) 50 MG tablet     No current facility-administered medications for this visit.      Past Medical History:   There is no problem list on file for this patient.    No past medical history on file.           Objective     BP (!) 157/85 (BP Location: Left arm, Patient Position: Sitting, Cuff Size: Adult Regular)    Pulse 75   Temp 98  F (36.7  C) (Oral)   Resp 16   Wt 62.3 kg (137 lb 6.4 oz)   SpO2 99%     Pertinent Physical Exam Findings    Gen: Pleasant female in no acute distress.  Appears stated age.  Casually groomed.   Resp:  CTAB  CV: RR, no murmur, no clicks or rubs  Psych:  Mood is good. Affect is full.       Results for orders placed or performed in visit on 03/24/23   UA reflex to Microscopic     Status: Abnormal   Result Value Ref Range    Color Urine Yellow Colorless, Straw, Light Yellow, Yellow    Appearance Urine Clear Clear    Glucose Urine Negative Negative mg/dL    Bilirubin Urine Negative Negative    Ketones Urine Negative Negative mg/dL    Specific Gravity Urine 1.010 1.005 - 1.030    Blood Urine Trace (A) Negative    pH Urine 6.5 5.0 - 8.0    Protein Albumin Urine Negative Negative mg/dL    Urobilinogen Urine 0.2 0.2, 1.0 E.U./dL    Nitrite Urine Negative Negative    Leukocyte Esterase Urine Small (A) Negative   Basic metabolic panel     Status: Abnormal   Result Value Ref Range    Sodium 140 136 - 145 mmol/L    Potassium 4.0 3.4 - 5.3 mmol/L    Chloride 102 98 - 107 mmol/L    Carbon Dioxide (CO2) 23 22 - 29 mmol/L    Anion Gap 15 7 - 15 mmol/L    Urea Nitrogen 13.8 6.0 - 20.0 mg/dL    Creatinine 0.74 0.51 - 0.95 mg/dL    Calcium 9.6 8.6 - 10.0 mg/dL    Glucose 102 (H) 70 - 99 mg/dL    GFR Estimate >90 >60 mL/min/1.73m2   Urine Microscopic Exam     Status: Abnormal   Result Value Ref Range    Bacteria Urine Few (A) None Seen /HPF    RBC Urine 0-2 0-2 /HPF /HPF    WBC Urine 0-5 0-5 /HPF /HPF    Squamous Epithelials Urine Few (A) None Seen /LPF                ----- Service Performed and Documented by Resident or Fellow ------

## 2023-03-24 NOTE — NURSING NOTE
Due to patient being non-English speaking/uses sign language, an  was used for this visit. Only for face-to-face interpretation by an external agency, date and length of interpretation can be found on the scanned worksheet.     name: Lawrence  Agency: [employer]  Language: Cindi   Telephone number: [phone]  Type of interpretation: Telephone, spoken

## 2023-03-24 NOTE — PROGRESS NOTES
Preceptor Attestation:    I discussed the patient with the resident and evaluated the patient in person. I have verified the content of the note, which accurately reflects my assessment of the patient and the plan of care.   Supervising Physician:  Deejay Leos MD.

## 2023-03-29 PROBLEM — E78.5 HYPERLIPIDEMIA LDL GOAL <100: Status: ACTIVE | Noted: 2023-03-29

## 2023-03-29 PROBLEM — I10 ESSENTIAL HYPERTENSION: Status: ACTIVE | Noted: 2023-03-29

## 2023-03-29 RX ORDER — NITROFURANTOIN 25; 75 MG/1; MG/1
100 CAPSULE ORAL 2 TIMES DAILY
Qty: 10 CAPSULE | Refills: 0 | Status: SHIPPED | OUTPATIENT
Start: 2023-03-29 | End: 2023-04-03

## 2023-04-21 ENCOUNTER — OFFICE VISIT (OUTPATIENT)
Dept: FAMILY MEDICINE | Facility: CLINIC | Age: 53
End: 2023-04-21
Payer: COMMERCIAL

## 2023-04-21 VITALS
TEMPERATURE: 98.3 F | DIASTOLIC BLOOD PRESSURE: 88 MMHG | SYSTOLIC BLOOD PRESSURE: 137 MMHG | WEIGHT: 139.3 LBS | HEART RATE: 72 BPM | OXYGEN SATURATION: 97 % | RESPIRATION RATE: 18 BRPM

## 2023-04-21 DIAGNOSIS — G47.9 SLEEP DISORDER: ICD-10-CM

## 2023-04-21 DIAGNOSIS — I10 ESSENTIAL HYPERTENSION: Primary | ICD-10-CM

## 2023-04-21 DIAGNOSIS — E78.5 HYPERLIPIDEMIA LDL GOAL <100: ICD-10-CM

## 2023-04-21 PROCEDURE — 99214 OFFICE O/P EST MOD 30 MIN: CPT | Mod: GC

## 2023-04-21 NOTE — PROGRESS NOTES
[facility] Visit    Assessment & Plan   ASSESSMENT / PLAN:  (I10) Essential hypertension  (primary encounter diagnosis)  Comment: Patient's blood pressure in clinic today 137/88.  Blood pressures at home have been similar between 130-140 systolic and 80-85 diastolic.  Currently taking losartan 100 mg daily.  Goal to have blood pressure less than 130/80.  Not quite at goal but very close.  Shared decision making on whether or not to add an additional agent today.  Decision was made to maintain current therapy with losartan and to recheck blood pressure in 2 to 3 months.  Currently working lifestyle modifications including low-fat and low-salt diet along with daily exercise.  Plan: Continue losartan 100 mg daily  - Continue dietary and lifestyle changes  - Follow-up in 2 to 3 months for recheck of blood pressure    (E78.5) Hyperlipidemia LDL goal <100  Comment: LDL was 262 as of 1 month ago and patient was started on atorvastatin 40 mg daily.  Patient has been consistently taking this without side effects.  Would be beneficial to recheck this in a few months to see if the LDL is lowering.  For now continue atorvastatin.  Plan: Continue atorvastatin 40 mg daily  -Recheck lipid panel at next visit    (G47.9) Sleep disorder  Comment: Patient reported having troubles falling asleep at home.  She was given a prescription for hydroxyzine to help with sleep which she has been using as needed and has proven very helpful.  Feeling more rested.  Does have concern about her snoring.  STOP-BANG score of 3 today putting her at intermediate risk of ROBERT.  Discussed option of getting sleep study which she declined.  Appears that her sleeping has been much improved by hydroxyzine so we will continue that without change.  Could consider sleep study in the future.  Recommended Breathe Right strips which she said she has at home but has not tried.  Plan: Continue hydroxyzine as needed for sleep          RTC in 2 to 3  months for follow up of hyperlipidemia and hypertension or sooner if develops new or worsening symptoms.    Options for treatment and follow-up care were reviewed with the patient who was engaged and actively involved in the decision making process, verbalized understanding of the options discussed, and satisfied with the final plan.    Patient was staffed with supervising physician, Dr. Magaña.     Ion Hendricks MD, PGY2  [facility]    [facility]   Amina Schumacher is a 53 year old female with a history including hyperlipidemia, hypertension, sleep troubles who presents for follow-up of hypertension, hyperlipidemia and sleep troubles.      Hypertension Follow-up  <130/80    Outpatient blood pressures are being checked at home.  Results are 130//80.    Chest Pain? :No     Low Salt Diet: low salt    Daily NSAID Use?No     Did patient take their HTN pills today/last night as usual?  Yes    Trouble with sleep/Snoring  Last visit was prescribed hydroxyzine which she is using as needed at night to help her fall asleep.     Do you snore loudly? YES  Do you often feel tired, fatigued, or sleepy during the daytime? No  Has anyone observed you stop breathing during sleep? No  Do you have or are you being treated for high blood pressure? YES  BMI (less than 35/greater than 35) No  Age (less than 50/greater than 50) 53 years old  Neck circumference (less than 40 cm/greater than 40 cm) 34 cm  Gender: Female    Total:     Last Basic Metabolic Panel:  Lab Results   Component Value Date     03/24/2023      Lab Results   Component Value Date    POTASSIUM 4.0 03/24/2023    POTASSIUM 4.5 03/10/2023     Lab Results   Component Value Date    CHLORIDE 102 03/24/2023    CHLORIDE 99 03/10/2023     Lab Results   Component Value Date    CROW 9.6 03/24/2023     Lab Results   Component Value Date    CO2 23 03/24/2023    CO2 34 03/10/2023     Lab Results   Component Value Date    BUN 13.8 03/24/2023    BUN 9 03/10/2023     Lab  Results   Component Value Date    CR 0.74 03/24/2023     Lab Results   Component Value Date     03/24/2023    GLC 94 03/10/2023       Adherence and Exercise  Medication side effects: no  How often is a medication missed? Never  Exercise:walking  6-7 days/week for an average of 30-45 minutes       Patient Active Problem List    Diagnosis Date Noted     Essential hypertension 03/29/2023     Priority: Medium     Hyperlipidemia LDL goal <100 03/29/2023     Priority: Medium       Social: She reports that she has never smoked. She has never used smokeless tobacco.    There are no exam notes on file for this visit.    Objective     Vitals:    04/21/23 1521 04/21/23 1523   BP: (!) 152/90 137/88   BP Location: Left arm    Patient Position: Sitting    Cuff Size: Adult Regular    Pulse: 72    Resp: 18    Temp: 98.3  F (36.8  C)    TempSrc: Oral    SpO2: 97%    Weight: 63.2 kg (139 lb 4.8 oz)      There is no height or weight on file to calculate BMI.    GEN: NAD, healthy, alert  Constitutional: Awake, alert, cooperative, no acute distress, and appears stated age.  Lungs: No increased WOB. CTABL, no crackles or wheezing appreciated.  Cardiovascular: Appears well perfused. RRR, normal S1 and S2, no S3 or S4, and no murmur appreciated.  Skin: No visible rashes, erythema, pallor, petechia or purpura.    Labs:  Office Visit on 03/24/2023   Component Date Value Ref Range Status     Color Urine 03/24/2023 Yellow  Colorless, Straw, Light Yellow, Yellow Final     Appearance Urine 03/24/2023 Clear  Clear Final     Glucose Urine 03/24/2023 Negative  Negative mg/dL Final     Bilirubin Urine 03/24/2023 Negative  Negative Final     Ketones Urine 03/24/2023 Negative  Negative mg/dL Final     Specific Gravity Urine 03/24/2023 1.010  1.005 - 1.030 Final     Blood Urine 03/24/2023 Trace (A)  Negative Final     pH Urine 03/24/2023 6.5  5.0 - 8.0 Final     Protein Albumin Urine 03/24/2023 Negative  Negative mg/dL Final     Urobilinogen  Urine 03/24/2023 0.2  0.2, 1.0 E.U./dL Final     Nitrite Urine 03/24/2023 Negative  Negative Final     Leukocyte Esterase Urine 03/24/2023 Small (A)  Negative Final     Sodium 03/24/2023 140  136 - 145 mmol/L Final     Potassium 03/24/2023 4.0  3.4 - 5.3 mmol/L Final     Chloride 03/24/2023 102  98 - 107 mmol/L Final     Carbon Dioxide (CO2) 03/24/2023 23  22 - 29 mmol/L Final     Anion Gap 03/24/2023 15  7 - 15 mmol/L Final     Urea Nitrogen 03/24/2023 13.8  6.0 - 20.0 mg/dL Final     Creatinine 03/24/2023 0.74  0.51 - 0.95 mg/dL Final     Calcium 03/24/2023 9.6  8.6 - 10.0 mg/dL Final     Glucose 03/24/2023 102 (H)  70 - 99 mg/dL Final     GFR Estimate 03/24/2023 >90  >60 mL/min/1.73m2 Final    eGFR calculated using 2021 CKD-EPI equation.     Bacteria Urine 03/24/2023 Few (A)  None Seen /HPF Final     RBC Urine 03/24/2023 0-2  0-2 /HPF /HPF Final     WBC Urine 03/24/2023 0-5  0-5 /HPF /HPF Final     Squamous Epithelials Urine 03/24/2023 Few (A)  None Seen /LPF Final

## 2024-04-17 DIAGNOSIS — I10 ESSENTIAL HYPERTENSION: ICD-10-CM

## 2024-04-17 DIAGNOSIS — E78.5 HYPERLIPIDEMIA LDL GOAL <100: ICD-10-CM

## 2024-04-18 RX ORDER — LOSARTAN POTASSIUM 100 MG/1
TABLET ORAL
Qty: 90 TABLET | Refills: 0 | Status: SHIPPED | OUTPATIENT
Start: 2024-04-18

## 2024-04-18 RX ORDER — ATORVASTATIN CALCIUM 40 MG/1
TABLET, FILM COATED ORAL
Qty: 90 TABLET | Refills: 0 | Status: SHIPPED | OUTPATIENT
Start: 2024-04-18

## 2024-04-18 NOTE — TELEPHONE ENCOUNTER
Routing refill request to provider for review/approval because:  Angiotensin-II Receptors [id number]/17/2024 02:37 PM   Protocol Details Has GFR on file in past 12 months and most recent value is normal    Normal serum potassium on file in past 12 months     Medication requested: LOSARTAN POTASSIUM 100MG TABLET   Last office visit: 04/21/2023  [facility] appointments: none  Medication last refilled: 11/10/2023   Last qualifying labs:   Component      Latest Ref Rng 3/24/2023  4:06 PM   Potassium      3.4 - 5.3 mmol/L 4.0        Component      Latest Ref Rng 3/24/2023  4:06 PM   GFR Estimate      >60 mL/min/1.73m2 >90          Routed to provider due to failed RN protocol.     Routing refill request to provider for review/approval because:    Antihyperlipidemic agents [id number]/17/2024 02:37 PM   Protocol Details LDL on file in the past 12 months        Medication requested: ATORVASTATIN CALCIUM 40MG TABLET   Last office visit: 04/21/2023  Future [facility] appointments: none  Medication last refilled: 11/10/2023   Last qualifying labs:   Component      Latest Ref Rng 3/10/2023  4:32 PM   LDL Cholesterol Calculated      <=100 mg/dL 262 (H)       Legend:  (H) High    Routed to provider due to failed RN protocol.     DANICA Garland

## 2024-10-25 DIAGNOSIS — I10 ESSENTIAL HYPERTENSION: ICD-10-CM

## 2024-10-25 DIAGNOSIS — E78.5 HYPERLIPIDEMIA LDL GOAL <100: ICD-10-CM

## 2024-10-25 RX ORDER — ATORVASTATIN CALCIUM 40 MG/1
TABLET, FILM COATED ORAL
Qty: 90 TABLET | Refills: 0 | Status: SHIPPED | OUTPATIENT
Start: 2024-10-25

## 2024-10-25 RX ORDER — LOSARTAN POTASSIUM 100 MG/1
TABLET ORAL
Qty: 90 TABLET | Refills: 0 | Status: SHIPPED | OUTPATIENT
Start: 2024-10-25

## 2024-10-25 NOTE — TELEPHONE ENCOUNTER
Name from pharmacy: ATORVASTATIN CALCIUM 40MG TABLET         Will file in chart as: atorvastatin (LIPITOR) 40 MG tablet    Sig: TAKE 1 TABLET (40 MG) BY MOUTH DAILY FOR CHOLESTEROL    Disp: 90 tablet    Refills: 0    Start: 10/25/2024    Class: E-Prescribe    Non-formulary For: Hyperlipidemia LDL goal <100    Last ordered: 6 months ago (4/18/2024) by Deejay Leos MD    Last refill: 4/19/2024    Rx #: [id number]    Antihyperlipidemic agents Dunvkp18/25/2024 10:56 AM   Protocol Details LDL on file in the past 12 months    Recent (12 mo) or future (90 days) visit within the authorizing provider's specialty    Medication is active on med list    Patient is age 18 years or older    No active pregnancy on record    No positive pregnancy test in past 12 mos       Name from pharmacy: LOSARTAN POTASSIUM 100MG TABLET         Will file in chart as: losartan (COZAAR) 100 MG tablet    Sig: TAKE 1 TABLET (100 MG) BY MOUTH DAILY FOR HIGH BLOOD PRESSURE    Disp: 90 tablet    Refills: 0    Start: 10/25/2024    Class: E-Prescribe    For: Essential hypertension    Last ordered: 6 months ago (4/18/2024) by Deejay Leos MD    Last refill: 4/19/2024    Rx #: [id number]    Angiotensin-II Receptors Iwkhsw68/25/2024 10:56 AM   Protocol Details Most recent blood pressure under 140/90 in past 12 months    Has GFR on file in past 12 months and most recent value is normal    Recent (12 mo) or future (90days) visit within the authorizing provider's specialty    Normal serum potassium on file in past 12 months

## 2025-01-03 ENCOUNTER — OFFICE VISIT (OUTPATIENT)
Dept: FAMILY MEDICINE | Facility: CLINIC | Age: 55
End: 2025-01-03
Payer: COMMERCIAL

## 2025-01-03 VITALS
RESPIRATION RATE: 16 BRPM | HEART RATE: 93 BPM | TEMPERATURE: 98.4 F | DIASTOLIC BLOOD PRESSURE: 109 MMHG | BODY MASS INDEX: 28.25 KG/M2 | WEIGHT: 149.6 LBS | HEIGHT: 61 IN | SYSTOLIC BLOOD PRESSURE: 185 MMHG | OXYGEN SATURATION: 98 %

## 2025-01-03 DIAGNOSIS — Z12.31 VISIT FOR SCREENING MAMMOGRAM: ICD-10-CM

## 2025-01-03 DIAGNOSIS — I10 ESSENTIAL HYPERTENSION: ICD-10-CM

## 2025-01-03 DIAGNOSIS — Z12.4 CERVICAL CANCER SCREENING: ICD-10-CM

## 2025-01-03 DIAGNOSIS — Z86.39 HISTORY OF HYPERGLYCEMIA: ICD-10-CM

## 2025-01-03 DIAGNOSIS — R73.03 PREDIABETES: ICD-10-CM

## 2025-01-03 DIAGNOSIS — E78.5 HYPERLIPIDEMIA LDL GOAL <100: ICD-10-CM

## 2025-01-03 DIAGNOSIS — Z12.11 SCREEN FOR COLON CANCER: ICD-10-CM

## 2025-01-03 DIAGNOSIS — Z23 NEED FOR PROPHYLACTIC VACCINATION AND INOCULATION AGAINST INFLUENZA: ICD-10-CM

## 2025-01-03 DIAGNOSIS — Z23 NEED FOR HEPATITIS B VACCINATION: ICD-10-CM

## 2025-01-03 DIAGNOSIS — Z00.00 ROUTINE GENERAL MEDICAL EXAMINATION AT A HEALTH CARE FACILITY: Primary | ICD-10-CM

## 2025-01-03 LAB
ANION GAP SERPL CALCULATED.3IONS-SCNC: 9 MMOL/L (ref 7–15)
BUN SERPL-MCNC: 12.9 MG/DL (ref 6–20)
CALCIUM SERPL-MCNC: 9.8 MG/DL (ref 8.8–10.4)
CHLORIDE SERPL-SCNC: 103 MMOL/L (ref 98–107)
CHOLEST SERPL-MCNC: 293 MG/DL
CREAT SERPL-MCNC: 0.8 MG/DL (ref 0.51–0.95)
EGFRCR SERPLBLD CKD-EPI 2021: 87 ML/MIN/1.73M2
EST. AVERAGE GLUCOSE BLD GHB EST-MCNC: 126 MG/DL
FASTING STATUS PATIENT QL REPORTED: NO
FASTING STATUS PATIENT QL REPORTED: NO
GLUCOSE SERPL-MCNC: 85 MG/DL (ref 70–99)
HBA1C MFR BLD: 6 % (ref 0–5.6)
HCO3 SERPL-SCNC: 29 MMOL/L (ref 22–29)
HDLC SERPL-MCNC: 47 MG/DL
LDLC SERPL CALC-MCNC: 198 MG/DL
NONHDLC SERPL-MCNC: 246 MG/DL
POTASSIUM SERPL-SCNC: 3.8 MMOL/L (ref 3.4–5.3)
SODIUM SERPL-SCNC: 141 MMOL/L (ref 135–145)
TRIGL SERPL-MCNC: 240 MG/DL

## 2025-01-03 RX ORDER — ATORVASTATIN CALCIUM 40 MG/1
40 TABLET, FILM COATED ORAL DAILY
Qty: 90 TABLET | Refills: 3 | Status: SHIPPED | OUTPATIENT
Start: 2025-01-03

## 2025-01-03 RX ORDER — LOSARTAN POTASSIUM 100 MG/1
100 TABLET ORAL DAILY
Qty: 90 TABLET | Refills: 1 | Status: SHIPPED | OUTPATIENT
Start: 2025-01-03

## 2025-01-03 SDOH — HEALTH STABILITY: PHYSICAL HEALTH: ON AVERAGE, HOW MANY DAYS PER WEEK DO YOU ENGAGE IN MODERATE TO STRENUOUS EXERCISE (LIKE A BRISK WALK)?: 0 DAYS

## 2025-01-03 SDOH — HEALTH STABILITY: PHYSICAL HEALTH: ON AVERAGE, HOW MANY MINUTES DO YOU ENGAGE IN EXERCISE AT THIS LEVEL?: 0 MIN

## 2025-01-03 ASSESSMENT — SOCIAL DETERMINANTS OF HEALTH (SDOH): HOW OFTEN DO YOU GET TOGETHER WITH FRIENDS OR RELATIVES?: ONCE A WEEK

## 2025-01-03 NOTE — PROGRESS NOTES
Prior to immunization administration, verified patients identity using patient s name and date of birth. Please see Immunization Activity for additional information.     Screening Questionnaire for Adult Immunization    Are you sick today?   No   Do you have allergies to medications, food, a vaccine component or latex?   No   Have you ever had a serious reaction after receiving a vaccination?   No   Do you have a long-term health problem with heart, lung, kidney, or metabolic disease (e.g., diabetes), asthma, a blood disorder, no spleen, complement component deficiency, a cochlear implant, or a spinal fluid leak?  Are you on long-term aspirin therapy?   No   Do you have cancer, leukemia, HIV/AIDS, or any other immune system problem?   No   Do you have a parent, brother, or sister with an immune system problem?   No   In the past 3 months, have you taken medications that affect  your immune system, such as prednisone, other steroids, or anticancer drugs; drugs for the treatment of rheumatoid arthritis, Crohn s disease, or psoriasis; or have you had radiation treatments?   No   Have you had a seizure, or a brain or other nervous system problem?   No   During the past year, have you received a transfusion of blood or blood    products, or been given immune (gamma) globulin or antiviral drug?   No   For women: Are you pregnant or is there a chance you could become       pregnant during the next month?   No   Have you received any vaccinations in the past 4 weeks?   No     Immunization questionnaire answers were all negative.      Patient instructed to remain in clinic for 15 minutes afterwards, and to report any adverse reactions.     Screening performed by Yonny Fisher CMA on 1/3/2025 at 5:10 PM.

## 2025-01-03 NOTE — PROGRESS NOTES
Preceptor Attestation:    I discussed the patient with the resident and evaluated the patient in person. I have verified the content of the note, which accurately reflects my assessment of the patient and the plan of care.   Supervising Physician:  Wilber Page MD.

## 2025-01-03 NOTE — PROGRESS NOTES
Preventive Care Visit  [facility]  Jd Cota DO, Family Medicine  [place] 3, 2025    Assessment & Plan     Ma was seen today for physical, sleep problem and imm/inj.    Diagnoses and all orders for this visit:    Routine general medical examination at a health care facility  Cervical cancer screening  Visit for screening mammogram  Screen for colon cancer  Need for prophylactic vaccination and inoculation against influenza  Need for hepatitis B vaccination  -     HEPATITIS B, ADULT 20+ (ENGERIX-B/RECOMBIVAX HB)  -     INFLUENZA VACCINE,SPLIT VIRUS,TRIVALENT,PF(FLUZONE)  -     Fecal colorectal cancer screen FIT  -     MA Screening Bilateral w/ Qasim; Future  -     HPV and Gynecologic Cytology Panel - Recommended Age 30 - 65 Years    Essential hypertension  Discussed with patient that given significant blood pressure elevation 185/109 today in clinic, will obtain BMP today.  She has not taken losartan for over 1 month.  Recommended reinitiating this medication.  Refill sent to pharmacy on file.  Currently asymptomatic denies any visual changes.  Recommend reevaluation in approximately 2 weeks for repeat blood pressure check.  In the meantime I have sent a home blood pressure monitor DME order and recommend she monitor her blood pressures daily.  Discussed with patient that she may require more than 1 antihypertensive medication given her blood pressure is above goal greater than 20 mmHg systolically.  -     Home Blood Pressure Monitor Order  -     losartan (COZAAR) 100 MG tablet; Take 1 tablet (100 mg) by mouth daily.  -     BASIC METABOLIC PANEL    Hyperlipidemia LDL goal <100  -Patient has a history of hyperlipidemia.  Will order repeat lipid panel today and send in a refill of atorvastatin to her pharmacy on file.  She verbalized clear understanding and agreement to this treatment plan and had no further questions or concerns at this time.  -     atorvastatin (LIPITOR) 40 MG tablet; Take 1  "tablet (40 mg) by mouth daily.  -     Lipid panel reflex to direct LDL Non-fasting    History of hyperglycemia  Prediabetes  -Discussed with patient that recommend we check hemoglobin A1c, given her history of hyperglycemia in the past.  Hemoglobin A1c returned today at 6.0, putting her in the prediabetic range.  Recommend dietary and lifestyle interventions and we will discuss these further with her at her follow-up appointment in approximately 2 weeks.  Will recommend consideration of urine microalbumin testing as well.  -     Hemoglobin A1c    BMI  Estimated body mass index is 28.27 kg/m  as calculated from the following:    Height as of this encounter: 1.549 m (5' 1\").    Weight as of this encounter: 67.9 kg (149 lb 9.6 oz).       Counseling  Appropriate preventive services were addressed with this patient via screening, questionnaire, or discussion as appropriate for fall prevention, nutrition, physical activity, Tobacco-use cessation, social engagement, weight loss and cognition.  Checklist reviewing preventive services available has been given to the patient.  Reviewed patient's diet, addressing concerns and/or questions.   The patient was instructed to see the dentist every 6 months.       Return in about 2 weeks (around 1/17/2025).    Subjective   Ma is a 54 year old, presenting for the following:  Physical, Sleep Problem, and Imm/Inj (Flu Shot)        1/3/2025     4:07 PM   Additional Questions   Roomed by Mao   Accompanied by self         1/3/2025    Information    services provided? Yes   Language Dheeraj   Type of interpretation provided Face-to-face    name Tony Cifuentes Maseng    Agency [employer]          HPI  Patient presents for routine preventative exam and to discuss her chronic conditions of high blood pressure and high cholesterol.  She has not been into the clinic recently as she was not working and did not have good insurance coverage.  She reports that she " would like to focus on preventative health measures and is interested in all preventative screenings.  She denies headache, fever, chills, nausea, vomiting, shortness of breath, chest pain.  She reports no current concerns.  She does report that she has been relatively intermittent with taking her blood pressure medication.  She reports that it has been 1 month since she last took this medication.    Health Care Directive  Patient does not have a Health Care Directive: Discussed advance care planning with patient; however, patient declined at this time.      1/3/2025   General Health   How would you rate your overall physical health? Good   Feel stress (tense, anxious, or unable to sleep) Not at all         1/3/2025   Nutrition   Three or more servings of calcium each day? Yes   Diet: Regular (no restrictions)   How many servings of fruit and vegetables per day? 4 or more   How many sweetened beverages each day? 0-1         1/3/2025   Exercise   Days per week of moderate/strenous exercise 0 days   Average minutes spent exercising at this level 0 min   (!) EXERCISE CONCERN      1/3/2025   Social Factors   Frequency of gathering with friends or relatives Once a week   Worry food won't last until get money to buy more No   Food not last or not have enough money for food? No   Do you have housing? (Housing is defined as stable permanent housing and does not include staying ouside in a car, in a tent, in an abandoned building, in an overnight shelter, or couch-surfing.) Yes   Are you worried about losing your housing? No   Lack of transportation? No   Unable to get utilities (heat,electricity)? No         1/3/2025   Fall Risk   Fallen 2 or more times in the past year? No   Trouble with walking or balance? No          1/3/2025   Dental   Dentist two times every year? (!) NO         1/3/2025   TB Screening   Were you born outside of the [place]? Yes         Today's PHQ-2 Score:       1/3/2025     4:10 PM   PHQ-2 ( 1999 [employer])  "  Q1: Little interest or pleasure in doing things 0   Q2: Feeling down, depressed or hopeless 0   PHQ-2 Score 0           1/3/2025   Substance Use   Alcohol more than 3/day or more than 7/wk No   Do you use any other substances recreationally? No     Social History     Tobacco Use    Smoking status: Never    Smokeless tobacco: Never        Mammogram Screening - Mammogram every 1-2 years updated in Health Maintenance based on mutual decision making        1/3/2025   STI Screening   New sexual partner(s) since last STI/HIV test? No     History of abnormal Pap smear: History of intermittent PAPs, she reports one normal PAP circa 2009, no other PAPs       ASCVD Risk   The ASCVD Risk score (Wili VICTORIA, et al., 2019) failed to calculate for the following reasons:    The valid total cholesterol range is 130 to 320 mg/dL       Reviewed and updated as needed this visit by Provider   Tobacco  Allergies  Meds  Problems  Med Hx  Surg Hx  Fam Hx            Past Medical History:   Diagnosis Date    Essential hypertension     Hyperlipidemia LDL goal <100      History reviewed. No pertinent surgical history.      Review of Systems  Constitutional, HEENT, cardiovascular, pulmonary, gi and gu systems are negative, except as otherwise noted.  ROS reviewed, see HPI for pertinent positives and negatives.  Jd Cota, DO       Objective    Exam  BP (!) 185/109   Pulse 93   Temp 98.4  F (36.9  C) (Oral)   Resp 16   Ht 1.549 m (5' 1\")   Wt 67.9 kg (149 lb 9.6 oz)   SpO2 98%   BMI 28.27 kg/m     Estimated body mass index is 28.27 kg/m  as calculated from the following:    Height as of this encounter: 1.549 m (5' 1\").    Weight as of this encounter: 67.9 kg (149 lb 9.6 oz).    Physical Exam  Constitutional:       General: She is not in acute distress.     Appearance: She is not toxic-appearing or diaphoretic.   HENT:      Head: Normocephalic and atraumatic.      Right Ear: External ear normal.      Left Ear: " External ear normal.      Nose: Nose normal.      Mouth/Throat:      Mouth: Mucous membranes are moist.      Pharynx: No oropharyngeal exudate or posterior oropharyngeal erythema.   Eyes:      General: No scleral icterus.     Conjunctiva/sclera: Conjunctivae normal.   Cardiovascular:      Rate and Rhythm: Normal rate.      Heart sounds: No murmur heard.     No friction rub. No gallop.   Pulmonary:      Effort: Pulmonary effort is normal. No respiratory distress.      Breath sounds: Normal breath sounds. No wheezing or rales.   Chest:      Chest wall: No tenderness.   Abdominal:      General: Bowel sounds are normal.   Musculoskeletal:      Right lower leg: No edema.      Left lower leg: No edema.   Skin:     Findings: No rash.   Neurological:      General: No focal deficit present.      Mental Status: She is alert and oriented to person, place, and time.   Psychiatric:         Mood and Affect: Mood normal.         Behavior: Behavior normal.       Precepted with Dr. Wilber Page MD who agrees with assessment and plan as outlined above    Signed Electronically by: Jd Cota DO

## 2025-01-03 NOTE — PATIENT INSTRUCTIONS
Patient Education   Preventive Care Advice   This is general advice given by our system to help you stay healthy. However, your care team may have specific advice just for you. Please talk to your care team about your preventive care needs.  Nutrition  Eat 5 or more servings of fruits and vegetables each day.  Try wheat bread, brown rice and whole grain pasta (instead of white bread, rice, and pasta).  Get enough calcium and vitamin D. Check the label on foods and aim for 100% of the RDA (recommended daily allowance).  Lifestyle  Exercise at least 150 minutes each week  (30 minutes a day, 5 days a week).  Do muscle strengthening activities 2 days a week. These help control your weight and prevent disease.  No smoking.  Wear sunscreen to prevent skin cancer.  Have a dental exam and cleaning every 6 months.  Yearly exams  See your health care team every year to talk about:  Any changes in your health.  Any medicines your care team has prescribed.  Preventive care, family planning, and ways to prevent chronic diseases.  Shots (vaccines)   HPV shots (up to age 26), if you've never had them before.  Hepatitis B shots (up to age 59), if you've never had them before.  COVID-19 shot: Get this shot when it's due.  Flu shot: Get a flu shot every year.  Tetanus shot: Get a tetanus shot every 10 years.  Pneumococcal, hepatitis A, and RSV shots: Ask your care team if you need these based on your risk.  Shingles shot (for age 50 and up)  General health tests  Diabetes screening:  Starting at age 35, Get screened for diabetes at least every 3 years.  If you are younger than age 35, ask your care team if you should be screened for diabetes.  Cholesterol test: At age 39, start having a cholesterol test every 5 years, or more often if advised.  Bone density scan (DEXA): At age 50, ask your care team if you should have this scan for osteoporosis (brittle bones).  Hepatitis C: Get tested at least once in your life.  STIs (sexually  transmitted infections)  Before age 24: Ask your care team if you should be screened for STIs.  After age 24: Get screened for STIs if you're at risk. You are at risk for STIs (including HIV) if:  You are sexually active with more than one person.  You don't use condoms every time.  You or a partner was diagnosed with a sexually transmitted infection.  If you are at risk for HIV, ask about PrEP medicine to prevent HIV.  Get tested for HIV at least once in your life, whether you are at risk for HIV or not.  Cancer screening tests  Cervical cancer screening: If you have a cervix, begin getting regular cervical cancer screening tests starting at age 21.  Breast cancer scan (mammogram): If you've ever had breasts, begin having regular mammograms starting at age 40. This is a scan to check for breast cancer.  Colon cancer screening: It is important to start screening for colon cancer at age 45.  Have a colonoscopy test every 10 years (or more often if you're at risk) Or, ask your provider about stool tests like a FIT test every year or Cologuard test every 3 years.  To learn more about your testing options, visit:   .  For help making a decision, visit:   https://bit.ly/pq52137.  Prostate cancer screening test: If you have a prostate, ask your care team if a prostate cancer screening test (PSA) at age 55 is right for you.  Lung cancer screening: If you are a current or former smoker ages 50 to 80, ask your care team if ongoing lung cancer screenings are right for you.  For informational purposes only. Not to replace the advice of your health care provider. Copyright   2023 [employer]. All rights reserved. Clinically reviewed by the [facility] Transitions Program. [employer] [id number] - REV 01/24.

## 2025-01-03 NOTE — PROGRESS NOTES
DME (Durable Medical Equipment) Orders and Documentation  Orders Placed This Encounter   Procedures    Home Blood Pressure Monitor Order      The patient was assessed and it was determined the patient is in need of the following listed DME Supplies/Equipment. Please complete supporting documentation below to demonstrate medical necessity.

## 2025-01-06 LAB
HPV HR 12 DNA CVX QL NAA+PROBE: NEGATIVE
HPV16 DNA CVX QL NAA+PROBE: NEGATIVE
HPV18 DNA CVX QL NAA+PROBE: NEGATIVE
HUMAN PAPILLOMA VIRUS FINAL DIAGNOSIS: NORMAL

## 2025-01-08 LAB
BKR AP ASSOCIATED HPV REPORT: NORMAL
BKR LAB AP GYN ADEQUACY: NORMAL
BKR LAB AP GYN INTERPRETATION: NORMAL
BKR LAB AP PREVIOUS ABNORMAL: NORMAL
PATH REPORT.COMMENTS IMP SPEC: NORMAL
PATH REPORT.COMMENTS IMP SPEC: NORMAL
PATH REPORT.RELEVANT HX SPEC: NORMAL

## 2025-01-21 ENCOUNTER — OFFICE VISIT (OUTPATIENT)
Dept: FAMILY MEDICINE | Facility: CLINIC | Age: 55
End: 2025-01-21
Payer: COMMERCIAL

## 2025-01-21 VITALS
RESPIRATION RATE: 16 BRPM | BODY MASS INDEX: 27.56 KG/M2 | OXYGEN SATURATION: 97 % | TEMPERATURE: 98.5 F | DIASTOLIC BLOOD PRESSURE: 82 MMHG | HEIGHT: 61 IN | SYSTOLIC BLOOD PRESSURE: 130 MMHG | WEIGHT: 146 LBS | HEART RATE: 79 BPM

## 2025-01-21 DIAGNOSIS — I10 ESSENTIAL HYPERTENSION: Primary | ICD-10-CM

## 2025-01-21 DIAGNOSIS — E78.5 HYPERLIPIDEMIA LDL GOAL <100: ICD-10-CM

## 2025-01-21 DIAGNOSIS — G47.9 SLEEPING DIFFICULTY: ICD-10-CM

## 2025-01-21 DIAGNOSIS — R73.03 PREDIABETES: ICD-10-CM

## 2025-01-21 RX ORDER — HYDROXYZINE HYDROCHLORIDE 25 MG/1
25 TABLET, FILM COATED ORAL
Qty: 90 TABLET | Refills: 1 | Status: SHIPPED | OUTPATIENT
Start: 2025-01-21

## 2025-01-21 RX ORDER — LOSARTAN POTASSIUM 100 MG/1
100 TABLET ORAL DAILY
Qty: 90 TABLET | Refills: 2 | Status: SHIPPED | OUTPATIENT
Start: 2025-01-21

## 2025-01-21 NOTE — PROGRESS NOTES
"  Assessment & Plan     Essential hypertension  -Blood pressure much improved since reinitiation of losartan approximately 2 weeks ago.  Blood pressure today 130/82.  Will obtain repeat BMP today after reinitiation of losartan.  - losartan (COZAAR) 100 MG tablet  Dispense: 90 tablet; Refill: 2  - Basic metabolic panel    Prediabetes  Hyperlipidemia LDL goal less than 100  -Most recent hemoglobin A1c 6.0.  Recommended dietary and lifestyle modifications, including increasing exercise and reducing the amount of simple carbs.  Recommend patient follow-up in approximately 6 months for reevaluation of lifestyle interventions.  -Recommend continuing on atorvastatin for CV risk modification.  Will obtain repeat lipid panel at next clinic visit.      Sleeping difficulty  -Reports occasional difficulty with falling asleep.  She reports that hydroxyzine has been beneficial in the past for this.  I will send in a refill of this medication to her pharmacy on file and she can follow-up on an as-needed basis if symptoms are not improved with management as outlined above.  Can also consider over-the-counter melatonin as an alternate agent.  - hydrOXYzine HCl (ATARAX) 25 MG tablet  Dispense: 90 tablet; Refill: 1    The longitudinal plan of care for the diagnosis(es)/condition(s) as documented were addressed during this visit. Due to the added complexity in care, I will continue to support Ma in the subsequent management and with ongoing continuity of care.      BMI  Estimated body mass index is 27.59 kg/m  as calculated from the following:    Height as of this encounter: 1.549 m (5' 1\").    Weight as of this encounter: 66.2 kg (146 lb).       Return in about 6 months (around 7/21/2025).    Subjective   Ma is a 55 year old, presenting for the following health issues:  Hypertension    HPI   Presents for follow-up regarding blood pressure.  She restarted losartan approximately 2 weeks ago and reports compliance with this medication.  " "In addition she restarted her statin.  She denies any symptoms or concerns.  She denies headache, fever, chills, nausea, vomiting, shortness of breath, chest pain.  She does report that she has occasional nights where it is difficult for her to fall asleep.  She reports that hydroxyzine was helpful for this in the past and she is interested in trying this again.  She reports no additional concerns at this time.      Review of Systems  Constitutional, HEENT, cardiovascular, pulmonary, gi and gu systems are negative, except as otherwise noted.  ROS reviewed, see HPI for pertinent positives and negatives.  Jd Angulosoledad, DO        Objective    /82   Pulse 79   Temp 98.5  F (36.9  C) (Oral)   Resp 16   Ht 1.549 m (5' 1\")   Wt 66.2 kg (146 lb)   SpO2 97%   BMI 27.59 kg/m    Body mass index is 27.59 kg/m .  Physical Exam  Constitutional:       General: She is not in acute distress.     Appearance: She is not toxic-appearing or diaphoretic.   HENT:      Head: Normocephalic and atraumatic.      Right Ear: External ear normal.      Left Ear: External ear normal.      Nose: Nose normal.      Mouth/Throat:      Mouth: Mucous membranes are moist.   Eyes:      General: No scleral icterus.     Conjunctiva/sclera: Conjunctivae normal.   Cardiovascular:      Rate and Rhythm: Normal rate and regular rhythm.      Heart sounds: No murmur heard.     No friction rub. No gallop.   Pulmonary:      Effort: Pulmonary effort is normal. No respiratory distress.      Breath sounds: No wheezing or rales.   Chest:      Chest wall: No tenderness.   Abdominal:      General: There is no distension.   Skin:     Findings: No rash.   Neurological:      General: No focal deficit present.      Mental Status: She is alert and oriented to person, place, and time.   Psychiatric:         Mood and Affect: Mood normal.         Behavior: Behavior normal.              Precepted with Dr. Russel Magaña who agrees with assessment and plan as outlined " above    Signed Electronically by: Jd Cota, DO

## 2025-01-22 LAB
ANION GAP SERPL CALCULATED.3IONS-SCNC: 12 MMOL/L (ref 7–15)
BUN SERPL-MCNC: 12.1 MG/DL (ref 6–20)
CALCIUM SERPL-MCNC: 9.6 MG/DL (ref 8.8–10.4)
CHLORIDE SERPL-SCNC: 101 MMOL/L (ref 98–107)
CREAT SERPL-MCNC: 1.15 MG/DL (ref 0.51–0.95)
EGFRCR SERPLBLD CKD-EPI 2021: 56 ML/MIN/1.73M2
GLUCOSE SERPL-MCNC: 109 MG/DL (ref 70–99)
HCO3 SERPL-SCNC: 27 MMOL/L (ref 22–29)
POTASSIUM SERPL-SCNC: 4.2 MMOL/L (ref 3.4–5.3)
SODIUM SERPL-SCNC: 140 MMOL/L (ref 135–145)

## 2025-03-03 NOTE — PROGRESS NOTES
"  Assessment & Plan     Essential hypertension  Ma presents today for follow-up regarding elevated blood pressure.  Her blood pressure is well-controlled today 120/81.  She reinitiated losartan 100 mg following our office visit on 1/3/2025.  Following this she had mildly elevated creatinine 1.15, most likely transient and in relation to restarting losartan. A repeat BMP was performed today and her creatinine is back to her baseline 0.80.  I will send in a 1 year supply of losartan 100 mg daily.  - Basic metabolic panel  - losartan (COZAAR) 100 MG tablet  Dispense: 90 tablet; Refill: 3    Need for hepatitis B vaccination  -     HEPATITIS B, ADULT 20+ (ENGERIX-B/RECOMBIVAX HB)    Hyperlipidemia LDL goal <100  -LDL elevated at previous visit 1/3/2025 was elevated to 198.  She has been compliant with atorvastatin 40 mg daily.  Will recheck lipid panel today.    The longitudinal plan of care for the diagnosis(es)/condition(s) as documented were addressed during this visit. Due to the added complexity in care, I will continue to support Ma in the subsequent management and with ongoing continuity of care.      BMI  Estimated body mass index is 27.4 kg/m  as calculated from the following:    Height as of this encounter: 1.549 m (5' 1\").    Weight as of this encounter: 65.8 kg (145 lb).       Return if symptoms worsen or fail to improve.    Subjective   Ma is a 55 year old, presenting for the following health issues:  Hypertension    HPI    Ma was seen by me on 1/21/2024. At that visit, she had good control of her blood pressure 130/82. She had recently re-initiated losartan 100 mg daily in January 2024.  She denies headache, fever, chills, nausea, vomiting, shortness of breath, chest pain.  She reports compliance with losartan and atorvastatin.  Reports no additional concerns at this time.      Review of Systems  Constitutional, HEENT, cardiovascular, pulmonary, gi and gu systems are negative, except as otherwise " "noted.  ROS reviewed, see HPI for pertinent positives and negatives.  Jd Cota,         Objective    /81   Pulse 72   Temp 98.2  F (36.8  C) (Oral)   Resp 16   Ht 1.549 m (5' 1\")   Wt 65.8 kg (145 lb)   SpO2 98%   BMI 27.40 kg/m    Body mass index is 27.4 kg/m .  Physical Exam  Constitutional:       General: She is not in acute distress.     Appearance: She is not toxic-appearing.   HENT:      Head: Normocephalic and atraumatic.      Right Ear: External ear normal.      Left Ear: External ear normal.      Nose: No congestion or rhinorrhea.      Mouth/Throat:      Pharynx: No oropharyngeal exudate.   Eyes:      General: No scleral icterus.     Conjunctiva/sclera: Conjunctivae normal.   Cardiovascular:      Rate and Rhythm: Normal rate and regular rhythm.      Heart sounds: No murmur heard.     No friction rub. No gallop.   Pulmonary:      Effort: Pulmonary effort is normal. No respiratory distress.      Breath sounds: No wheezing or rales.   Abdominal:      General: There is no distension.   Musculoskeletal:      Right lower leg: No edema.      Left lower leg: No edema.   Skin:     Findings: No rash.   Neurological:      General: No focal deficit present.      Mental Status: She is alert and oriented to person, place, and time.   Psychiatric:         Mood and Affect: Mood normal.         Behavior: Behavior normal.            Results for orders placed or performed in visit on 03/04/25   Basic metabolic panel     Status: Abnormal   Result Value Ref Range    Sodium 144 135 - 145 mmol/L    Potassium 4.4 3.4 - 5.3 mmol/L    Chloride 106 94 - 109 mmol/L    Carbon Dioxide (CO2) 27 20 - 32 mmol/L    Anion Gap 11 3 - 14 mmol/L    Urea Nitrogen 14 7 - 30 mg/dL    Creatinine 0.80 0.52 - 1.04 mg/dL    GFR Estimate 87 >60 mL/min/1.73m2    Calcium 9.4 8.5 - 10.1 mg/dL    Glucose 103 (H) 70 - 99 mg/dL         Precepted with Dr. Deejay Leos MD who agrees with assessment and plan as outlined above    Signed " Electronically by: Jd Cota, DO

## 2025-03-04 ENCOUNTER — OFFICE VISIT (OUTPATIENT)
Dept: FAMILY MEDICINE | Facility: CLINIC | Age: 55
End: 2025-03-04
Payer: COMMERCIAL

## 2025-03-04 VITALS
RESPIRATION RATE: 16 BRPM | OXYGEN SATURATION: 98 % | SYSTOLIC BLOOD PRESSURE: 120 MMHG | BODY MASS INDEX: 27.38 KG/M2 | TEMPERATURE: 98.2 F | HEART RATE: 72 BPM | WEIGHT: 145 LBS | DIASTOLIC BLOOD PRESSURE: 81 MMHG | HEIGHT: 61 IN

## 2025-03-04 DIAGNOSIS — I10 ESSENTIAL HYPERTENSION: Primary | ICD-10-CM

## 2025-03-04 DIAGNOSIS — E78.5 HYPERLIPIDEMIA LDL GOAL <100: ICD-10-CM

## 2025-03-04 DIAGNOSIS — Z23 NEED FOR HEPATITIS B VACCINATION: ICD-10-CM

## 2025-03-04 LAB
ANION GAP SERPL CALCULATED.3IONS-SCNC: 11 MMOL/L (ref 3–14)
BUN SERPL-MCNC: 14 MG/DL (ref 7–30)
CALCIUM SERPL-MCNC: 9.4 MG/DL (ref 8.5–10.1)
CHLORIDE BLD-SCNC: 106 MMOL/L (ref 94–109)
CHOLEST SERPL-MCNC: 147 MG/DL
CO2 SERPL-SCNC: 27 MMOL/L (ref 20–32)
CREAT SERPL-MCNC: 0.8 MG/DL (ref 0.52–1.04)
EGFRCR SERPLBLD CKD-EPI 2021: 87 ML/MIN/1.73M2
GLUCOSE BLD-MCNC: 103 MG/DL (ref 70–99)
HDLC SERPL-MCNC: 45 MG/DL
LDLC SERPL CALC-MCNC: 82 MG/DL
NONHDLC SERPL-MCNC: 102 MG/DL
POTASSIUM BLD-SCNC: 4.4 MMOL/L (ref 3.4–5.3)
SODIUM SERPL-SCNC: 144 MMOL/L (ref 135–145)
TRIGL SERPL-MCNC: 99 MG/DL

## 2025-03-04 PROCEDURE — 80061 LIPID PANEL: CPT

## 2025-03-04 PROCEDURE — G2211 COMPLEX E/M VISIT ADD ON: HCPCS

## 2025-03-04 PROCEDURE — 80048 BASIC METABOLIC PNL TOTAL CA: CPT

## 2025-03-04 PROCEDURE — 90746 HEPB VACCINE 3 DOSE ADULT IM: CPT

## 2025-03-04 PROCEDURE — 90471 IMMUNIZATION ADMIN: CPT

## 2025-03-04 PROCEDURE — 3074F SYST BP LT 130 MM HG: CPT

## 2025-03-04 PROCEDURE — 3079F DIAST BP 80-89 MM HG: CPT

## 2025-03-04 PROCEDURE — 99214 OFFICE O/P EST MOD 30 MIN: CPT | Mod: 25

## 2025-03-04 PROCEDURE — 36415 COLL VENOUS BLD VENIPUNCTURE: CPT

## 2025-03-04 RX ORDER — LOSARTAN POTASSIUM 100 MG/1
100 TABLET ORAL DAILY
Qty: 90 TABLET | Refills: 3 | Status: SHIPPED | OUTPATIENT
Start: 2025-03-04

## 2025-03-04 NOTE — PROGRESS NOTES
Prior to immunization administration, verified patients identity using patient s name and date of birth. Please see Immunization Activity for additional information.     Screening Questionnaire for Adult Immunization    Are you sick today?   No   Do you have allergies to medications, food, a vaccine component or latex?   No   Have you ever had a serious reaction after receiving a vaccination?   No   Do you have a long-term health problem with heart, lung, kidney, or metabolic disease (e.g., diabetes), asthma, a blood disorder, no spleen, complement component deficiency, a cochlear implant, or a spinal fluid leak?  Are you on long-term aspirin therapy?   No   Do you have cancer, leukemia, HIV/AIDS, or any other immune system problem?   No   Do you have a parent, brother, or sister with an immune system problem?   No   In the past 3 months, have you taken medications that affect  your immune system, such as prednisone, other steroids, or anticancer drugs; drugs for the treatment of rheumatoid arthritis, Crohn s disease, or psoriasis; or have you had radiation treatments?   No   Have you had a seizure, or a brain or other nervous system problem?   No   During the past year, have you received a transfusion of blood or blood    products, or been given immune (gamma) globulin or antiviral drug?   No   For women: Are you pregnant or is there a chance you could become       pregnant during the next month?   No   Have you received any vaccinations in the past 4 weeks?   No     Immunization questionnaire answers were all negative.      Patient instructed to remain in clinic for 15 minutes afterwards, and to report any adverse reactions.     Screening performed by Yonny Fisher CMA on 3/4/2025 at 9:07 AM.

## 2025-03-04 NOTE — PATIENT INSTRUCTIONS
Pressure was reassuring today.  Continue on your current dose of losartan and atorvastatin.  You can follow-up on an as-needed basis if any new concerns arise.  Otherwise plan to see you at your yearly preventative visit.  Jd Cota, DO

## 2025-03-04 NOTE — LETTER
March 5, 2025      Amina Schumacher  [address]  [place] [address]        Dear ,    We are writing to inform you of your test results.    LDL cholesterol is well controlled at 82, which is much better than where it was at 2 months ago. Recommend she continue her current daily medications.     Resulted Orders   Basic metabolic panel   Result Value Ref Range    Sodium 144 135 - 145 mmol/L    Potassium 4.4 3.4 - 5.3 mmol/L    Chloride 106 94 - 109 mmol/L    Carbon Dioxide (CO2) 27 20 - 32 mmol/L    Anion Gap 11 3 - 14 mmol/L    Urea Nitrogen 14 7 - 30 mg/dL    Creatinine 0.80 0.52 - 1.04 mg/dL    GFR Estimate 87 >60 mL/min/1.73m2    Calcium 9.4 8.5 - 10.1 mg/dL    Glucose 103 (H) 70 - 99 mg/dL   Lipid panel reflex to direct LDL Fasting   Result Value Ref Range    Cholesterol 147 <200 mg/dL    Triglycerides 99 <150 mg/dL    Direct Measure HDL 45 (L) >=50 mg/dL    LDL Cholesterol Calculated 82 <100 mg/dL    Non HDL Cholesterol 102 <130 mg/dL    Narrative    Cholesterol  Desirable: < 200 mg/dL  Borderline High: 200 - 239 mg/dL  High: >= 240 mg/dL    Triglycerides  Normal: < 150 mg/dL  Borderline High: 150 - 199 mg/dL  High: 200-499 mg/dL  Very High: >= 500 mg/dL    Direct Measure HDL  Female: >= 50 mg/dL   Male: >= 40 mg/dL    LDL Cholesterol  Desirable: < 100 mg/dL  Above Desirable: 100 - 129 mg/dL   Borderline High: 130 - 159 mg/dL   High:  160 - 189 mg/dL   Very High: >= 190 mg/dL    Non HDL Cholesterol  Desirable: < 130 mg/dL  Above Desirable: 130 - 159 mg/dL  Borderline High: 160 - 189 mg/dL  High: 190 - 219 mg/dL  Very High: >= 220 mg/dL       If you have any questions or concerns, please call the clinic at the number listed above.       Sincerely,      Deejay Leos MD    Electronically signed